# Patient Record
Sex: FEMALE | Race: WHITE | NOT HISPANIC OR LATINO | Employment: FULL TIME | URBAN - METROPOLITAN AREA
[De-identification: names, ages, dates, MRNs, and addresses within clinical notes are randomized per-mention and may not be internally consistent; named-entity substitution may affect disease eponyms.]

---

## 2021-04-28 ENCOUNTER — PATIENT MESSAGE (OUTPATIENT)
Dept: RESEARCH | Facility: HOSPITAL | Age: 26
End: 2021-04-28

## 2023-10-17 PROBLEM — R59.0 LAD (LYMPHADENOPATHY), POSTERIOR CERVICAL: Status: ACTIVE | Noted: 2023-10-17

## 2023-10-17 NOTE — PROGRESS NOTES
[facility] Hematolgy/Oncology  History & Physical    Subjective:      Patient ID:   NAME: Avis Gutierrez : 1995     27 y.o. female    Referring Doc: Laurence Kahn MD  Other Physicians:        Chief Complaint: posterior cervical LAD      HPI:  27 y.o. female with diagnosis of posterior cervical LAD who has been referred by Laurence Kahn MD for evaluation by medical hematology/oncology. She is here by herself. She noticed an LN on her left posterior neck about a month ago. [facility] and was prescribed some antibiotics but the LN never improved. She subsequently developed rash on the left neck sometime thereafter which was suspected of being shingles and was started on antivirals and it since resolved by she still has the LN.     She is  at [employer] . No tobacco or alcohol. Her  of breast cancer at 37yo. Her aunt  in her 60's of bladder or ureteral cancer.    No fevers or night sweats, no unexplained weight loss    She started ozempic about 4-5 months ago    Prior hx/of gallbladder issues resolved with change in diet    She has not been vaccinated for covid. She had covid back in               ROS:   GEN: normal without any fever, night sweats or weight loss; LN on left posterior neck for about a month  HEENT: normal with no HA's, sore throat, stiff neck, changes in vision  CV: normal with no CP, SOB, PND, MAXWELL or orthopnea  PULM: normal with no SOB, cough, hemoptysis, sputum or pleuritic pain  GI: normal with no abdominal pain, nausea, vomiting, constipation, diarrhea, melanotic stools, BRBPR, or hematemesis  : normal with no hematuria, dysuria  BREAST: normal with no mass, discharge, pain  SKIN: normal with no rash, erythema, bruising, or swelling       Past Medical/Surgical History:  Past Medical History:   Diagnosis Date    Cholelithiasis 2016    LAD (lymphadenopathy), posterior cervical 10/17/2023     History reviewed. No pertinent  "surgical history.      Allergies:  Review of patient's allergies indicates:  No Known Allergies      Social/Family History:  Social History     Socioeconomic History    Marital status: Single   Occupational History    Occupation: student & work at [employer]    Tobacco Use    Smoking status: Never    Smokeless tobacco: Never   Substance and Sexual Activity    Alcohol use: Yes     Alcohol/week: 1.0 standard drink of alcohol     Types: 1 Cans of beer per week     Comment: 2 beer per month    Drug use: No    Sexual activity: Never     Birth control/protection: None     Family History   Problem Relation Age of Onset    Cancer Mother         breast    Thyroid disease Sister          Medications:    Current Outpatient Medications:     acyclovir (ZOVIRAX) 400 MG tablet, TAKE 1 TABLET BY MOUTH 3 TIMES DAILY. (Patient not taking: Reported on 10/18/2023), Disp: 30 tablet, Rfl: 0    amoxicillin-clavulanate 500-125mg (AUGMENTIN) 500-125 mg Tab, Take 1 tablet (500 mg total) by mouth 2 (two) times daily. (Patient not taking: Reported on 9/6/2023), Disp: 10 tablet, Rfl: 0    clarithromycin (BIAXIN) 250 MG tablet, Take 2 tablets (500 mg total) by mouth every 12 (twelve) hours. (Patient not taking: Reported on 8/29/2023), Disp: 10 tablet, Rfl: 0    neomycin-polymyxin-gramicidin (NEOSPORIN, [id number],) ophthalmic solution, Place 1 drop into the right eye 4 (four) times daily. (Patient not taking: Reported on 1/11/2022), Disp: 10 mL, Rfl: 0      Pathology:   Cancer Staging   No matching staging information was found for the patient.      Objective:   Vitals:  Blood pressure 113/69, pulse 75, temperature 98 °F (36.7 °C), resp. rate 16, height 5' 2" (1.575 m), weight 81.5 kg (179 lb 11.2 oz).    Physical Examination:   GEN: no apparent distress, comfortable; AAOx3; mildly overweight  HEAD: atraumatic and normocephalic  EYES: no pallor, no icterus, PERRLA  ENT: OMM, no pharyngeal erythema, external ears WNL; no nasal discharge; no " thrush  NECK: no masses, thyroid normal, trachea midline, mildly enlarged posterior cervical left neck LN, supple  CV: RRR with no murmur; normal pulse; normal S1 and S2; no pedal edema  CHEST: Normal respiratory effort; CTAB; normal breath sounds; no wheeze or crackles  ABDOM: nontender and nondistended; soft; normal bowel sounds; no rebound/guarding  MUSC/Skeletal: ROM normal; no crepitus; joints normal; no deformities or arthropathy  EXTREM: no clubbing, cyanosis, inflammation or swelling  SKIN: no rashes, lesions, ulcers, petechiae or subcutaneous nodules  : no dickson  NEURO: grossly intact; motor/sensory WNL; AAOx3; no tremors  PSYCH: normal mood, affect and behavior  LYMPH: normal cervical, supraclavicular, axillary and groin LN's      Labs:   Lab Results   Component Value Date    WBC 4.97 08/29/2023    HGB 14.0 08/29/2023    HCT 41.2 08/29/2023    MCV 87 08/29/2023     08/29/2023    CMP  Sodium   Date Value Ref Range Status   08/29/2023 140 136 - 145 mmol/L Final     Potassium   Date Value Ref Range Status   08/29/2023 4.5 3.5 - 5.1 mmol/L Final     Chloride   Date Value Ref Range Status   08/29/2023 106 95 - 110 mmol/L Final     CO2   Date Value Ref Range Status   08/29/2023 25 23 - 29 mmol/L Final     Glucose   Date Value Ref Range Status   08/29/2023 88 70 - 110 mg/dL Final     BUN   Date Value Ref Range Status   08/29/2023 13 6 - 20 mg/dL Final     Creatinine   Date Value Ref Range Status   08/29/2023 0.8 0.5 - 1.4 mg/dL Final     Calcium   Date Value Ref Range Status   08/29/2023 9.6 8.7 - 10.5 mg/dL Final     Total Protein   Date Value Ref Range Status   08/29/2023 7.6 6.0 - 8.4 g/dL Final     Albumin   Date Value Ref Range Status   08/29/2023 4.4 3.5 - 5.2 g/dL Final     Total Bilirubin   Date Value Ref Range Status   08/29/2023 0.8 0.1 - 1.0 mg/dL Final     Comment:     For infants and newborns, interpretation of results should be based  on gestational age, weight and in agreement with  clinical  observations.    Premature Infant recommended reference ranges:  Up to 24 hours.............<8.0 mg/dL  Up to 48 hours............<12.0 mg/dL  3-5 days..................<15.0 mg/dL  6-29 days.................<15.0 mg/dL       Alkaline Phosphatase   Date Value Ref Range Status   2023 27 (L) 55 - 135 U/L Final     AST   Date Value Ref Range Status   2023 33 10 - 40 U/L Final     ALT   Date Value Ref Range Status   2023 26 10 - 44 U/L Final     Anion Gap   Date Value Ref Range Status   2023 9 8 - 16 mmol/L Final     eGFR if    Date Value Ref Range Status   10/22/2018 >60.0 >60 mL/min/1.73 m^2 Final     eGFR if non    Date Value Ref Range Status   10/22/2018 >60.0 >60 mL/min/1.73 m^2 Final     Comment:     Calculation used to obtain the estimated glomerular filtration  rate (eGFR) is the CKD-EPI equation.            Radiology/Diagnostic Studies:          All lab results and imaging results have been reviewed and discussed with the patient    Assessment:   (1) 27 y.o. female with diagnosis of posterior cervical LAD who has been referred by Laurence Kahn MD for evaluation by medical hematology/oncology. She noticed an LN on her left posterior neck about a month ago. She saw an ENT at [facility] and was prescribed some antibiotics but the LN never improved. She subsequently developed rash on the left neck sometime thereafter which was suspected of being shingles and was started on antivirals and it since resolved by she still has the LN. CBC is relatively WNL    - order CT chest/neck  - order mammogram    (2) Cholelithiasis    (3) Amenorrhea    (4) Obesity    (5) Fam hx/of breast cancer - mom  at 37yo    VISIT DIAGNOSES:     LAD (lymphadenopathy), posterior cervical    Posterior cervical adenopathy  -     Ambulatory referral/consult to Hematology / Oncology          Plan:     PLAN:  Order CT Chest and neck to further evaluate  Order  mammogram  Eventually needs to be in high breast clinic  F/u with PCP     RTC in  4 weeks   Fax note to Laurence Kahn MD        I have explained and the patient understands all of  the current recommendation(s). I have answered all of their questions to the best of my ability and to their complete satisfaction.           Thank you for allowing me to participate in this patient's care. Please call with any questions or concerns.    Electronically signed Connor Kwok MD

## 2023-10-18 ENCOUNTER — OFFICE VISIT (OUTPATIENT)
Dept: HEMATOLOGY/ONCOLOGY | Facility: CLINIC | Age: 28
End: 2023-10-18
Payer: COMMERCIAL

## 2023-10-18 ENCOUNTER — TELEPHONE (OUTPATIENT)
Dept: HEMATOLOGY/ONCOLOGY | Facility: CLINIC | Age: 28
End: 2023-10-18

## 2023-10-18 VITALS
DIASTOLIC BLOOD PRESSURE: 69 MMHG | HEIGHT: 62 IN | HEART RATE: 75 BPM | RESPIRATION RATE: 16 BRPM | BODY MASS INDEX: 33.07 KG/M2 | TEMPERATURE: 98 F | WEIGHT: 179.69 LBS | SYSTOLIC BLOOD PRESSURE: 113 MMHG

## 2023-10-18 DIAGNOSIS — R59.0 POSTERIOR CERVICAL ADENOPATHY: ICD-10-CM

## 2023-10-18 DIAGNOSIS — Z80.3 FAMILY HISTORY OF BREAST CANCER: Primary | ICD-10-CM

## 2023-10-18 DIAGNOSIS — R59.0 LAD (LYMPHADENOPATHY), POSTERIOR CERVICAL: ICD-10-CM

## 2023-10-18 PROCEDURE — 99203 OFFICE O/P NEW LOW 30 MIN: CPT | Mod: S$GLB,,, | Performed by: INTERNAL MEDICINE

## 2023-10-18 PROCEDURE — 3008F BODY MASS INDEX DOCD: CPT | Mod: CPTII,S$GLB,, | Performed by: INTERNAL MEDICINE

## 2023-10-18 PROCEDURE — 3074F PR MOST RECENT SYSTOLIC BLOOD PRESSURE < 130 MM HG: ICD-10-PCS | Mod: CPTII,S$GLB,, | Performed by: INTERNAL MEDICINE

## 2023-10-18 PROCEDURE — 3008F PR BODY MASS INDEX (BMI) DOCUMENTED: ICD-10-PCS | Mod: CPTII,S$GLB,, | Performed by: INTERNAL MEDICINE

## 2023-10-18 PROCEDURE — 3078F PR MOST RECENT DIASTOLIC BLOOD PRESSURE < 80 MM HG: ICD-10-PCS | Mod: CPTII,S$GLB,, | Performed by: INTERNAL MEDICINE

## 2023-10-18 PROCEDURE — 3078F DIAST BP <80 MM HG: CPT | Mod: CPTII,S$GLB,, | Performed by: INTERNAL MEDICINE

## 2023-10-18 PROCEDURE — 1160F PR REVIEW ALL MEDS BY PRESCRIBER/CLIN PHARMACIST DOCUMENTED: ICD-10-PCS | Mod: CPTII,S$GLB,, | Performed by: INTERNAL MEDICINE

## 2023-10-18 PROCEDURE — 3044F PR MOST RECENT HEMOGLOBIN A1C LEVEL <7.0%: ICD-10-PCS | Mod: CPTII,S$GLB,, | Performed by: INTERNAL MEDICINE

## 2023-10-18 PROCEDURE — 1159F MED LIST DOCD IN RCRD: CPT | Mod: CPTII,S$GLB,, | Performed by: INTERNAL MEDICINE

## 2023-10-18 PROCEDURE — 1160F RVW MEDS BY RX/DR IN RCRD: CPT | Mod: CPTII,S$GLB,, | Performed by: INTERNAL MEDICINE

## 2023-10-18 PROCEDURE — 3044F HG A1C LEVEL LT 7.0%: CPT | Mod: CPTII,S$GLB,, | Performed by: INTERNAL MEDICINE

## 2023-10-18 PROCEDURE — 1159F PR MEDICATION LIST DOCUMENTED IN MEDICAL RECORD: ICD-10-PCS | Mod: CPTII,S$GLB,, | Performed by: INTERNAL MEDICINE

## 2023-10-18 PROCEDURE — 3074F SYST BP LT 130 MM HG: CPT | Mod: CPTII,S$GLB,, | Performed by: INTERNAL MEDICINE

## 2023-10-18 PROCEDURE — 99203 PR OFFICE/OUTPT VISIT, NEW, LEVL III, 30-44 MIN: ICD-10-PCS | Mod: S$GLB,,, | Performed by: INTERNAL MEDICINE

## 2023-10-18 NOTE — TELEPHONE ENCOUNTER
Referral placed to high risk breast clinic per Dr Kwok's orders to do so. Cary aware to sent with all needed documents.

## 2023-10-27 ENCOUNTER — TELEPHONE (OUTPATIENT)
Dept: HEMATOLOGY/ONCOLOGY | Facility: CLINIC | Age: 28
End: 2023-10-27

## 2023-10-27 DIAGNOSIS — Z80.3 FAMILY HISTORY OF BREAST CANCER: Primary | ICD-10-CM

## 2023-10-27 DIAGNOSIS — R59.0 LAD (LYMPHADENOPATHY), POSTERIOR CERVICAL: ICD-10-CM

## 2023-10-27 NOTE — TELEPHONE ENCOUNTER
----- Message from JESSICA Leon sent at 10/27/2023 11:58 AM CDT -----  Regarding: RE: payable code needed  Yes switch to diagnostic    Libby  ----- Message -----  From: Cristal Tello, RN  Sent: 10/27/2023  11:07 AM CDT  To: JESSICA Leon  Subject: FW: payable code needed                          Can you please review and let me know if ok to keep as screening and just update code to screening for high risk or if I need to change to diagnostic mammo? Looks like Dr. ROBERTS saw her and placed referral to high risk breast clinic.   ----- Message -----  From: Huey Strong  Sent: 10/27/2023  10:21 AM CDT  To: Sundar Ryan Staff  Subject: payable code needed                              Good Morning,   The above mentioned PT has a Screening Mammo scheduled on Monday. Is this supposed to be a Diag Mammo?The pt is under 40 and the codes provided will work for a Diagnostic but will not be payable for a screening.   Can we get this updated please?    Thanks for your help,    Huey  [facility]-Service  [phone]

## 2023-10-27 NOTE — TELEPHONE ENCOUNTER
----- Message from JESSICA Leon sent at 10/27/2023 11:58 AM CDT -----  Regarding: RE: payable code needed  Yes switch to diagnostic    Libby  ----- Message -----  From: Cristal Tello, RN  Sent: 10/27/2023  11:07 AM CDT  To: JESSICA Leon  Subject: FW: payable code needed                          Can you please review and let me know if ok to keep as screening and just update code to screening for high risk or if I need to change to diagnostic mammo? Looks like Dr. ROBERTS saw her and placed referral to high risk breast clinic.   ----- Message -----  From: Huey Strong  Sent: 10/27/2023  10:21 AM CDT  To: Sundar Ryan Staff  Subject: payable code needed                              Good Morning,   The above mentioned PT has a Screening Mammo scheduled on Monday. Is this supposed to be a Diag Mammo?The pt is under 40 and the codes provided will work for a Diagnostic but will not be payable for a screening.   Can we get this updated please?    Thanks for your help,    Huey  [facility] Pre-Service  [phone]

## 2023-10-30 ENCOUNTER — HOSPITAL ENCOUNTER (OUTPATIENT)
Dept: RADIOLOGY | Facility: HOSPITAL | Age: 28
Discharge: HOME | End: 2023-10-30
Attending: INTERNAL MEDICINE
Payer: COMMERCIAL

## 2023-10-30 DIAGNOSIS — R59.0 POSTERIOR CERVICAL ADENOPATHY: ICD-10-CM

## 2023-10-30 DIAGNOSIS — R59.0 LAD (LYMPHADENOPATHY), POSTERIOR CERVICAL: ICD-10-CM

## 2023-10-30 PROCEDURE — 71260 CT THORAX DX C+: CPT | Mod: TC

## 2023-10-30 PROCEDURE — 25500020 PHARM REV CODE 255: Performed by: INTERNAL MEDICINE

## 2023-10-30 RX ADMIN — IOHEXOL 100 ML: 350 INJECTION, SOLUTION INTRAVENOUS at 08:10

## 2023-11-13 NOTE — PROGRESS NOTES
[facility] Hematology/Oncology  PROGRESS NOTE - 2nd Follow-up Visit      Subjective:       Patient ID:   NAME: Avis Gutierrez : 1995     27 y.o. female    Referring Doc: Laurence Kahn MD  Other Physicians:        Chief Complaint: posterior cervical LAD f/u      History of Present Illness:     Patient returns today for a 2nd regularly scheduled follow-up visit.  The patient is here today to go over the results of the recently ordered labs, tests and studies. She is here by herself.    She is doing ok; some more frequent HA's than usual. No changes in the LN's. No fevers night sweats or weight loss    Breathing ok , NO CP, N/V    She had CT Neck and chest on 10/30            ROS:   GENNo fevers or night sweats, no unexplained weight loss : normal without any fever, night sweats or weight loss  HEENT: normal with no HA's, sore throat, stiff neck, changes in vision  CV: normal with no CP, SOB, PND, MAXWELL or orthopnea  PULM: normal with no SOB, cough, hemoptysis, sputum or pleuritic pain  GI: normal with no abdominal pain, nausea, vomiting, constipation, diarrhea, melanotic stools, BRBPR, or hematemesis  : normal with no hematuria, dysuria  BREAST: normal with no mass, discharge, pain  SKIN: normal with no rash, erythema, bruising, or swelling    Pain Scale:    Allergies:  Review of patient's allergies indicates:  No Known Allergies    Medications:    Current Outpatient Medications:     acyclovir (ZOVIRAX) 400 MG tablet, TAKE 1 TABLET BY MOUTH 3 TIMES DAILY. (Patient not taking: Reported on 10/18/2023), Disp: 30 tablet, Rfl: 0    amoxicillin-clavulanate 500-125mg (AUGMENTIN) 500-125 mg Tab, Take 1 tablet (500 mg total) by mouth 2 (two) times daily. (Patient not taking: Reported on 2023), Disp: 10 tablet, Rfl: 0    clarithromycin (BIAXIN) 250 MG tablet, Take 2 tablets (500 mg total) by mouth every 12 (twelve) hours. (Patient not taking: Reported on 2023), Disp: 10 tablet, Rfl: 0     neomycin-polymyxin-gramicidin (NEOSPORIN, [id number],) ophthalmic solution, Place 1 drop into the right eye 4 (four) times daily. (Patient not taking: Reported on 1/11/2022), Disp: 10 mL, Rfl: 0    PMHx/PSHx Updates:  See patient's last visit with me on 10/18/2023.  See H&P on         Pathology:  Cancer Staging   No matching staging information was found for the patient.          Objective:     Vitals:  There were no vitals taken for this visit.    Physical Examination:   GEN: no apparent distress, comfortable; AAOx3; mildly overweight  HEAD: atraumatic and normocephalic  EYES: no pallor, no icterus, PERRLA  ENT: OMM, no pharyngeal erythema, external ears WNL; no nasal discharge; no thrush  NECK: no masses, thyroid normal, trachea midline, mildly enlarged posterior cervical left neck LN, supple  CV: RRR with no murmur; normal pulse; normal S1 and S2; no pedal edema  CHEST: Normal respiratory effort; CTAB; normal breath sounds; no wheeze or crackles  ABDOM: nontender and nondistended; soft; normal bowel sounds; no rebound/guarding  MUSC/Skeletal: ROM normal; no crepitus; joints normal; no deformities or arthropathy  EXTREM: no clubbing, cyanosis, inflammation or swelling  SKIN: no rashes, lesions, ulcers, petechiae or subcutaneous nodules  : no dickson  NEURO: grossly intact; motor/sensory WNL; AAOx3; no tremors  PSYCH: normal mood, affect and behavior  LYMPH: normal cervical, supraclavicular, axillary and groin LN's            Labs:     Lab Results   Component Value Date    WBC 4.97 08/29/2023    HGB 14.0 08/29/2023    HCT 41.2 08/29/2023    MCV 87 08/29/2023     08/29/2023       CMP  Sodium   Date Value Ref Range Status   08/29/2023 140 136 - 145 mmol/L Final     Potassium   Date Value Ref Range Status   08/29/2023 4.5 3.5 - 5.1 mmol/L Final     Chloride   Date Value Ref Range Status   08/29/2023 106 95 - 110 mmol/L Final     CO2   Date Value Ref Range Status   08/29/2023 25 23 - 29 mmol/L Final      Glucose   Date Value Ref Range Status   08/29/2023 88 70 - 110 mg/dL Final     BUN   Date Value Ref Range Status   08/29/2023 13 6 - 20 mg/dL Final     Creatinine   Date Value Ref Range Status   08/29/2023 0.8 0.5 - 1.4 mg/dL Final     Calcium   Date Value Ref Range Status   08/29/2023 9.6 8.7 - 10.5 mg/dL Final     Total Protein   Date Value Ref Range Status   08/29/2023 7.6 6.0 - 8.4 g/dL Final     Albumin   Date Value Ref Range Status   08/29/2023 4.4 3.5 - 5.2 g/dL Final     Total Bilirubin   Date Value Ref Range Status   08/29/2023 0.8 0.1 - 1.0 mg/dL Final     Comment:     For infants and newborns, interpretation of results should be based  on gestational age, weight and in agreement with clinical  observations.    Premature Infant recommended reference ranges:  Up to 24 hours.............<8.0 mg/dL  Up to 48 hours............<12.0 mg/dL  3-5 days..................<15.0 mg/dL  6-29 days.................<15.0 mg/dL       Alkaline Phosphatase   Date Value Ref Range Status   08/29/2023 27 (L) 55 - 135 U/L Final     AST   Date Value Ref Range Status   08/29/2023 33 10 - 40 U/L Final     ALT   Date Value Ref Range Status   08/29/2023 26 10 - 44 U/L Final     Anion Gap   Date Value Ref Range Status   08/29/2023 9 8 - 16 mmol/L Final     eGFR   Date Value Ref Range Status   08/29/2023 >60.0 >60 mL/min/1.73 m^2 Final           Radiology/Diagnostic Studies:    CT Neck Chest With Contrast (XPD)    Result Date: 10/30/2023  CMS MANDATED QUALITY DATA - CT RADIATION 436 All CT scans at this facility utilize dose modulation, iterative reconstruction, and/or weight based dosing when appropriate to reduce radiation dose to as low as reasonably achievable. CT of the neck and chest with contrast HISTORY: Cervical lymphadenopathy. Examination utilizes 100 mL Omnipaque 350. There are no prior studies available for comparison purposes. The visualized intracranial structures and skull base appear unremarkable. The visualized  paranasal sinuses, mastoid air cells and middle ear cavities appear appropriately aerated. The major salivary glands and pharyngeal and laryngeal soft tissues and visualized upper airway appear unremarkable. There are numerous scattered normal-sized cervical lymph nodes. Borderline prominent lymph nodes in the jugulodigastric region measure up to 10 mm on the left. Left posterior cervical chain lymph node measures 5 mm in short axis dimension. The infrahyoid portion of the neck including the thyroid gland appears unremarkable. There is no pathologically enlarged hilar, mediastinal or axillary adenopathy. The heart is normal in size. There is no significant pericardial fluid. The heart and great vessels enhance appropriately. The lungs are appropriately expanded. There are no confluent areas of airspace disease or significant volume loss. No suspicious parenchymal nodules are identified. There is no effusion. The central airways are patent. Within the visualized portion of the upper abdomen, numerous gallstones are seen within the gallbladder. No acute osseous abnormality identified. IMPRESSION: Borderline prominent jugulodigastric lymph nodes with otherwise scattered normal-sized cervical lymph nodes demonstrated. No pathologic adenopathy observed within the chest. Cholelithiasis. Electronically signed by:  Rachel Park MD  10/30/2023 09:42 AM CDT Workstation: [id number]      I have reviewed all available lab results and radiology reports.    Assessment/Plan:   (1) 27 y.o. female with diagnosis of posterior cervical LAD who has been referred by Laurence Kahn MD for evaluation by medical hematology/oncology. She noticed an LN on her left posterior neck about a month ago. She saw an ENT at [facility] and was prescribed some antibiotics but the LN never improved. She subsequently developed rash on the left neck sometime thereafter which was suspected of being shingles and was started on antivirals and it  since resolved by she still has the LN. CBC is relatively WNL     - order CT chest/neck  - order mammogram    11/15/2023:  - CT Chest and neck done on 10/30  - only borderline prominent jugulodigastric lymph nodes with otherwise scattered normal-sized cervical lymph nodes demonstrated; and no pathologic adenopathy observed within the chest.     (2) Cholelithiasis     (3) Amenorrhea     (4) Obesity     (5) Fam hx/of breast cancer - mom  at 39yo         VISIT DIAGNOSES:      LAD (lymphadenopathy), posterior cervical          PLAN:  Refer to ENT for opinion but expect she will just need to have continued observation  Ordered mammogram scheduled for this coming Monday  Eventually needs to be in high breast clinic  F/u with PCP     RTC in  4-6 weeks   Fax note to Laurence Kahn MD Vadiee, Abdolreza, MD,      Discussion:     COVID-19 Discussion:    I had long discussion with patient and any applicable family about the COVID-19 coronavirus epidemic and the recommended precautions with regard to cancer and/or hematology patients. I have re-iterated the CDC recommendations for adequate hand washing, use of hand -like products, and coughing into elbow, etc. In addition, especially for our patients who are on chemotherapy and/or our otherwise immunocompromised patients, I have recommended avoidance of crowds, including movie theaters, restaurants, churches, etc. I have recommended avoidance of any sick or symptomatic family members and/or friends. I have also recommended avoidance of any raw and unwashed food products, and general avoidance of food items that have not been prepared by themselves. The patient has been asked to call us immediately with any symptom developments, issues, questions or other general concerns.     I spent over 25 mins of time with the patient. Reviewed results of the recently ordered labs, tests and studies; made directives with regards to the results. Over half of this time was  spent couseling and coordinating care.    I have explained all of the above in detail and the patient understands all of the current recommendation(s). I have answered all of their questions to the best of my ability and to their complete satisfaction.   The patient is to continue with the current management plan.            Electronically signed by Connor Kwok MD

## 2023-11-15 ENCOUNTER — TELEPHONE (OUTPATIENT)
Dept: OTOLARYNGOLOGY | Facility: CLINIC | Age: 28
End: 2023-11-15
Payer: COMMERCIAL

## 2023-11-15 ENCOUNTER — OFFICE VISIT (OUTPATIENT)
Dept: HEMATOLOGY/ONCOLOGY | Facility: CLINIC | Age: 28
End: 2023-11-15
Payer: COMMERCIAL

## 2023-11-15 VITALS
WEIGHT: 176.13 LBS | DIASTOLIC BLOOD PRESSURE: 86 MMHG | SYSTOLIC BLOOD PRESSURE: 121 MMHG | BODY MASS INDEX: 32.41 KG/M2 | RESPIRATION RATE: 16 BRPM | HEART RATE: 73 BPM | TEMPERATURE: 97 F | HEIGHT: 62 IN

## 2023-11-15 DIAGNOSIS — R59.0 LAD (LYMPHADENOPATHY), POSTERIOR CERVICAL: Primary | ICD-10-CM

## 2023-11-15 PROCEDURE — 3079F DIAST BP 80-89 MM HG: CPT | Mod: CPTII,S$GLB,, | Performed by: INTERNAL MEDICINE

## 2023-11-15 PROCEDURE — 3074F PR MOST RECENT SYSTOLIC BLOOD PRESSURE < 130 MM HG: ICD-10-PCS | Mod: CPTII,S$GLB,, | Performed by: INTERNAL MEDICINE

## 2023-11-15 PROCEDURE — 1159F PR MEDICATION LIST DOCUMENTED IN MEDICAL RECORD: ICD-10-PCS | Mod: CPTII,S$GLB,, | Performed by: INTERNAL MEDICINE

## 2023-11-15 PROCEDURE — 1160F PR REVIEW ALL MEDS BY PRESCRIBER/CLIN PHARMACIST DOCUMENTED: ICD-10-PCS | Mod: CPTII,S$GLB,, | Performed by: INTERNAL MEDICINE

## 2023-11-15 PROCEDURE — 99214 OFFICE O/P EST MOD 30 MIN: CPT | Mod: S$GLB,,, | Performed by: INTERNAL MEDICINE

## 2023-11-15 PROCEDURE — 1159F MED LIST DOCD IN RCRD: CPT | Mod: CPTII,S$GLB,, | Performed by: INTERNAL MEDICINE

## 2023-11-15 PROCEDURE — 1160F RVW MEDS BY RX/DR IN RCRD: CPT | Mod: CPTII,S$GLB,, | Performed by: INTERNAL MEDICINE

## 2023-11-15 PROCEDURE — 3079F PR MOST RECENT DIASTOLIC BLOOD PRESSURE 80-89 MM HG: ICD-10-PCS | Mod: CPTII,S$GLB,, | Performed by: INTERNAL MEDICINE

## 2023-11-15 PROCEDURE — 3074F SYST BP LT 130 MM HG: CPT | Mod: CPTII,S$GLB,, | Performed by: INTERNAL MEDICINE

## 2023-11-15 PROCEDURE — 3008F PR BODY MASS INDEX (BMI) DOCUMENTED: ICD-10-PCS | Mod: CPTII,S$GLB,, | Performed by: INTERNAL MEDICINE

## 2023-11-15 PROCEDURE — 3044F PR MOST RECENT HEMOGLOBIN A1C LEVEL <7.0%: ICD-10-PCS | Mod: CPTII,S$GLB,, | Performed by: INTERNAL MEDICINE

## 2023-11-15 PROCEDURE — 99214 PR OFFICE/OUTPT VISIT, EST, LEVL IV, 30-39 MIN: ICD-10-PCS | Mod: S$GLB,,, | Performed by: INTERNAL MEDICINE

## 2023-11-15 PROCEDURE — 3044F HG A1C LEVEL LT 7.0%: CPT | Mod: CPTII,S$GLB,, | Performed by: INTERNAL MEDICINE

## 2023-11-15 PROCEDURE — 3008F BODY MASS INDEX DOCD: CPT | Mod: CPTII,S$GLB,, | Performed by: INTERNAL MEDICINE

## 2023-11-15 NOTE — TELEPHONE ENCOUNTER
Left message on voicemail for pt to call back to schedule ENT referral. Dr. Kwok's office faxed us the referral asking us to call pt to schedule. Thanks, Karen

## 2023-11-20 ENCOUNTER — TELEPHONE (OUTPATIENT)
Dept: HEMATOLOGY/ONCOLOGY | Facility: CLINIC | Age: 28
End: 2023-11-20

## 2023-11-20 ENCOUNTER — HOSPITAL ENCOUNTER (OUTPATIENT)
Dept: RADIOLOGY | Facility: HOSPITAL | Age: 28
Discharge: HOME | End: 2023-11-20
Attending: NURSE PRACTITIONER
Payer: COMMERCIAL

## 2023-11-20 DIAGNOSIS — Z91.89 AT HIGH RISK FOR BREAST CANCER: Primary | ICD-10-CM

## 2023-11-20 DIAGNOSIS — R59.0 LAD (LYMPHADENOPATHY), POSTERIOR CERVICAL: ICD-10-CM

## 2023-11-20 DIAGNOSIS — Z80.3 FAMILY HISTORY OF BREAST CANCER: ICD-10-CM

## 2023-11-20 NOTE — TELEPHONE ENCOUNTER
Spoke with patient and reviewed that mammogram was normal, however Tyrer-Cuzick score was elevated and recommendation for High Risk Breast Clinic was made. Patient verbalized understanding, is agreeable to referral to the High Risk Breast Clinic.

## 2023-11-20 NOTE — TELEPHONE ENCOUNTER
----- Message from Zoe Rosen RN sent at 2023  1:22 PM CST -----    ----- Message -----  From: Libby Jin NP-C  Sent: 2023  11:02 AM CST  To: Connor Kwok MD; Davin Souza Staff    Please call and notify patient her mammogram results was normal, but her TC is 21% Can get her in to the high risk clinic. Mom  of breast cancer at 38. Can discuss genetic testing as well.

## 2023-11-21 ENCOUNTER — TELEPHONE (OUTPATIENT)
Dept: ONCOLOGY | Facility: CLINIC | Age: 28
End: 2023-11-21

## 2023-11-22 ENCOUNTER — TELEPHONE (OUTPATIENT)
Dept: ONCOLOGY | Facility: CLINIC | Age: 28
End: 2023-11-22

## 2023-12-06 ENCOUNTER — TELEPHONE (OUTPATIENT)
Dept: HEMATOLOGY/ONCOLOGY | Facility: CLINIC | Age: 28
End: 2023-12-06
Payer: COMMERCIAL

## 2023-12-06 NOTE — NURSING
Returned pt's call.  Upon reviewing chart, I noticed the high risk breast clinic in [place] had been trying to reach her.  Confirmed with pt, she would prefer to go to [place].  Sent message to Charisma Chan.

## 2023-12-06 NOTE — TELEPHONE ENCOUNTER
----- Message from Ashley Bello, Patient Care Assistant sent at 12/6/2023  9:49 AM CST -----  Type: Needs Medical Advice  Who Called:  Avis  Best Call Back Number: [phone]    Additional Information: [facility] has referrals for ENT as well as a breast specialty  , please call to further  discuss,. Thank you .

## 2023-12-12 NOTE — PROGRESS NOTES
[facility]  PROGRESS NOTE -   Follow-up Visit      Subjective:       Patient ID:   NAME: Avis Gutierrez : 1995     28 y.o. female    Referring Doc: Laurence Kahn MD  Other Physicians:        Chief Complaint: posterior cervical LAD f/u      History of Present Illness:     Patient returns today for a regularly scheduled follow-up visit.  The patient is here today to go over the results of the recently ordered labs, tests and studies. She is here by herself.    She has been having some more pain than usual on the left posterior neck for past couple of weeks. No changes in the LN's. No fevers night sweats or weight loss. Breathing ok , NO CP, N/V. Some more fatigue than usual.    She had CT Neck and chest on 10/30/2023 and she had mammogram on 2023. She sees Libby Jin NP with High Risk Breast in 2024    She has not followed up with ENT            ROS:   GENNo fevers or night sweats, no unexplained weight loss : normal without any fever, night sweats or weight loss; some intermittent neck pain (high of 6 or 7)  HEENT: normal with no HA's, sore throat, stiff neck, changes in vision  CV: normal with no CP, SOB, PND, MAXWELL or orthopnea  PULM: normal with no SOB, cough, hemoptysis, sputum or pleuritic pain  GI: normal with no abdominal pain, nausea, vomiting, constipation, diarrhea, melanotic stools, BRBPR, or hematemesis  : normal with no hematuria, dysuria  BREAST: normal with no mass, discharge, pain  SKIN: normal with no rash, erythema, bruising, or swelling    Pain Scale: see above    Allergies:  Review of patient's allergies indicates:  No Known Allergies    Medications:    Current Outpatient Medications:     acyclovir (ZOVIRAX) 400 MG tablet, TAKE 1 TABLET BY MOUTH 3 TIMES DAILY. (Patient not taking: Reported on 10/18/2023), Disp: 30 tablet, Rfl: 0    amoxicillin-clavulanate 500-125mg (AUGMENTIN) 500-125 mg Tab, Take 1 tablet (500 mg total) by mouth 2 (two) times daily.  (Patient not taking: Reported on 9/6/2023), Disp: 10 tablet, Rfl: 0    clarithromycin (BIAXIN) 250 MG tablet, Take 2 tablets (500 mg total) by mouth every 12 (twelve) hours. (Patient not taking: Reported on 8/29/2023), Disp: 10 tablet, Rfl: 0    neomycin-polymyxin-gramicidin (NEOSPORIN, XTB-BXHUQ-OBJMIICP,) ophthalmic solution, Place 1 drop into the right eye 4 (four) times daily. (Patient not taking: Reported on 1/11/2022), Disp: 10 mL, Rfl: 0    PMHx/PSHx Updates:  See patient's last visit with me on 11/15/2023.  See H&P on 10/18/2023        Pathology:   Cancer Staging   No matching staging information was found for the patient.          Objective:     Vitals:  Blood pressure 115/74, pulse 73, temperature 98.2 °F (36.8 °C), resp. rate 16, weight 77.7 kg (171 lb 3.2 oz).    Physical Examination:   GEN: no apparent distress, comfortable; AAOx3; mildly overweight  HEAD: atraumatic and normocephalic  EYES: no pallor, no icterus, PERRLA  ENT: OMM, no pharyngeal erythema, external ears WNL; no nasal discharge; no thrush  NECK: no masses, thyroid normal, trachea midline, mildly enlarged posterior cervical left neck LN, supple  CV: RRR with no murmur; normal pulse; normal S1 and S2; no pedal edema  CHEST: Normal respiratory effort; CTAB; normal breath sounds; no wheeze or crackles  ABDOM: nontender and nondistended; soft; normal bowel sounds; no rebound/guarding  MUSC/Skeletal: ROM normal; no crepitus; joints normal; no deformities or arthropathy  EXTREM: no clubbing, cyanosis, inflammation or swelling  SKIN: no rashes, lesions, ulcers, petechiae or subcutaneous nodules  : no dickson  NEURO: grossly intact; motor/sensory WNL; AAOx3; no tremors  PSYCH: normal mood, affect and behavior  LYMPH: normal cervical, supraclavicular, axillary and groin LN's            Labs:     Lab Results   Component Value Date    WBC 4.97 08/29/2023    HGB 14.0 08/29/2023    HCT 41.2 08/29/2023    MCV 87 08/29/2023     08/29/2023        CMP  Sodium   Date Value Ref Range Status   08/29/2023 140 136 - 145 mmol/L Final     Potassium   Date Value Ref Range Status   08/29/2023 4.5 3.5 - 5.1 mmol/L Final     Chloride   Date Value Ref Range Status   08/29/2023 106 95 - 110 mmol/L Final     CO2   Date Value Ref Range Status   08/29/2023 25 23 - 29 mmol/L Final     Glucose   Date Value Ref Range Status   08/29/2023 88 70 - 110 mg/dL Final     BUN   Date Value Ref Range Status   08/29/2023 13 6 - 20 mg/dL Final     Creatinine   Date Value Ref Range Status   08/29/2023 0.8 0.5 - 1.4 mg/dL Final     Calcium   Date Value Ref Range Status   08/29/2023 9.6 8.7 - 10.5 mg/dL Final     Total Protein   Date Value Ref Range Status   08/29/2023 7.6 6.0 - 8.4 g/dL Final     Albumin   Date Value Ref Range Status   08/29/2023 4.4 3.5 - 5.2 g/dL Final     Total Bilirubin   Date Value Ref Range Status   08/29/2023 0.8 0.1 - 1.0 mg/dL Final     Comment:     For infants and newborns, interpretation of results should be based  on gestational age, weight and in agreement with clinical  observations.    Premature Infant recommended reference ranges:  Up to 24 hours.............<8.0 mg/dL  Up to 48 hours............<12.0 mg/dL  3-5 days..................<15.0 mg/dL  6-29 days.................<15.0 mg/dL       Alkaline Phosphatase   Date Value Ref Range Status   08/29/2023 27 (L) 55 - 135 U/L Final     AST   Date Value Ref Range Status   08/29/2023 33 10 - 40 U/L Final     ALT   Date Value Ref Range Status   08/29/2023 26 10 - 44 U/L Final     Anion Gap   Date Value Ref Range Status   08/29/2023 9 8 - 16 mmol/L Final     eGFR   Date Value Ref Range Status   08/29/2023 >60.0 >60 mL/min/1.73 m^2 Final           Radiology/Diagnostic Studies:    CT Neck Chest With Contrast (XPD)    Result Date: 10/30/2023  CMS MANDATED QUALITY DATA - CT RADIATION 436 All CT scans at this facility utilize dose modulation, iterative reconstruction, and/or weight based dosing when appropriate to  reduce radiation dose to as low as reasonably achievable. CT of the neck and chest with contrast HISTORY: Cervical lymphadenopathy. Examination utilizes 100 mL Omnipaque 350. There are no prior studies available for comparison purposes. The visualized intracranial structures and skull base appear unremarkable. The visualized paranasal sinuses, mastoid air cells and middle ear cavities appear appropriately aerated. The major salivary glands and pharyngeal and laryngeal soft tissues and visualized upper airway appear unremarkable. There are numerous scattered normal-sized cervical lymph nodes. Borderline prominent lymph nodes in the jugulodigastric region measure up to 10 mm on the left. Left posterior cervical chain lymph node measures 5 mm in short axis dimension. The infrahyoid portion of the neck including the thyroid gland appears unremarkable. There is no pathologically enlarged hilar, mediastinal or axillary adenopathy. The heart is normal in size. There is no significant pericardial fluid. The heart and great vessels enhance appropriately. The lungs are appropriately expanded. There are no confluent areas of airspace disease or significant volume loss. No suspicious parenchymal nodules are identified. There is no effusion. The central airways are patent. Within the visualized portion of the upper abdomen, numerous gallstones are seen within the gallbladder. No acute osseous abnormality identified.     IMPRESSION: Borderline prominent jugulodigastric lymph nodes with otherwise scattered normal-sized cervical lymph nodes demonstrated. No pathologic adenopathy observed within the chest. Cholelithiasis. Electronically signed by:  Rachel Park MD  10/30/2023 09:42 AM CDT Workstation: [id number]      I have reviewed all available lab results and radiology reports.    Assessment/Plan:   (1) 28 y.o. female with diagnosis of posterior cervical LAD who has been referred by Laurence Kahn MD for evaluation by medical  hematology/oncology. She noticed an LN on her left posterior neck about a month ago. She saw an ENT at [facility] and was prescribed some antibiotics but the LN never improved. She subsequently developed rash on the left neck sometime thereafter which was suspected of being shingles and was started on antivirals and it since resolved by she still has the LN. CBC is relatively WNL     - order CT chest/neck  - order mammogram    11/15/2023:  - CT Chest and neck done on 10/30  - only borderline prominent jugulodigastric lymph nodes with otherwise scattered normal-sized cervical lymph nodes demonstrated; and no pathologic adenopathy observed within the chest.    2023:  - schedule MRI soft tissue and bone of neck  - encouraged her to proceed with ENT evaluation  - proceed with high risk breast clinic appointment in 2024  - consider PM&R pending MRI results     (2) Cholelithiasis     (3) Amenorrhea     (4) Obesity     (5) Fam hx/of breast cancer - mom  at 37yo         VISIT DIAGNOSES:      LAD (lymphadenopathy), posterior cervical          PLAN:  Set up MRI of soft tissue and bone of cervical neck  Encouraged her to proceed with ENT evaluation  Proceed with High Risk breast clinic appointment in 2024  Consider PM&R evaluation pending the MRI results  F/u with PCP     RTC in  6-12 weeks   Fax note to Laurence Kahn MD Vadiee, Abdolreza, MD,  Juancarlos    Discussion:     COVID-19 Discussion:    I had long discussion with patient and any applicable family about the COVID-19 coronavirus epidemic and the recommended precautions with regard to cancer and/or hematology patients. I have re-iterated the CDC recommendations for adequate hand washing, use of hand -like products, and coughing into elbow, etc. In addition, especially for our patients who are on chemotherapy and/or our otherwise immunocompromised patients, I have recommended avoidance of crowds, including movie theaters,  restaurants, churches, etc. I have recommended avoidance of any sick or symptomatic family members and/or friends. I have also recommended avoidance of any raw and unwashed food products, and general avoidance of food items that have not been prepared by themselves. The patient has been asked to call us immediately with any symptom developments, issues, questions or other general concerns.     I spent over 25 mins of time with the patient. Reviewed results of the recently ordered labs, tests and studies; made directives with regards to the results. Over half of this time was spent couseling and coordinating care.    I have explained all of the above in detail and the patient understands all of the current recommendation(s). I have answered all of their questions to the best of my ability and to their complete satisfaction.   The patient is to continue with the current management plan.            Electronically signed by Connor Kwok MD          Answers submitted by the patient for this visit:  Review of Systems Questionnaire (Submitted on 12/12/2023)  appetite change : No  unexpected weight change: No  mouth sores: No  visual disturbance: No  cough: No  shortness of breath: No  chest pain: No  abdominal pain: No  diarrhea: No  frequency: No  back pain: Yes  rash: No  headaches: No  adenopathy: Yes  nervous/ anxious: Yes

## 2023-12-13 ENCOUNTER — TELEPHONE (OUTPATIENT)
Dept: OTOLARYNGOLOGY | Facility: CLINIC | Age: 28
End: 2023-12-13
Payer: COMMERCIAL

## 2023-12-13 ENCOUNTER — OFFICE VISIT (OUTPATIENT)
Dept: HEMATOLOGY/ONCOLOGY | Facility: CLINIC | Age: 28
End: 2023-12-13
Payer: COMMERCIAL

## 2023-12-13 VITALS
DIASTOLIC BLOOD PRESSURE: 74 MMHG | TEMPERATURE: 98 F | WEIGHT: 171.19 LBS | BODY MASS INDEX: 31.31 KG/M2 | RESPIRATION RATE: 16 BRPM | HEART RATE: 73 BPM | SYSTOLIC BLOOD PRESSURE: 115 MMHG

## 2023-12-13 DIAGNOSIS — M54.2 NECK PAIN ON LEFT SIDE: ICD-10-CM

## 2023-12-13 DIAGNOSIS — R59.0 LAD (LYMPHADENOPATHY), POSTERIOR CERVICAL: Primary | ICD-10-CM

## 2023-12-13 PROCEDURE — 3008F BODY MASS INDEX DOCD: CPT | Mod: CPTII,S$GLB,, | Performed by: INTERNAL MEDICINE

## 2023-12-13 PROCEDURE — 1159F PR MEDICATION LIST DOCUMENTED IN MEDICAL RECORD: ICD-10-PCS | Mod: CPTII,S$GLB,, | Performed by: INTERNAL MEDICINE

## 2023-12-13 PROCEDURE — 99213 OFFICE O/P EST LOW 20 MIN: CPT | Mod: S$GLB,,, | Performed by: INTERNAL MEDICINE

## 2023-12-13 PROCEDURE — 1160F RVW MEDS BY RX/DR IN RCRD: CPT | Mod: CPTII,S$GLB,, | Performed by: INTERNAL MEDICINE

## 2023-12-13 PROCEDURE — 3078F DIAST BP <80 MM HG: CPT | Mod: CPTII,S$GLB,, | Performed by: INTERNAL MEDICINE

## 2023-12-13 PROCEDURE — 3074F SYST BP LT 130 MM HG: CPT | Mod: CPTII,S$GLB,, | Performed by: INTERNAL MEDICINE

## 2023-12-13 PROCEDURE — 3078F PR MOST RECENT DIASTOLIC BLOOD PRESSURE < 80 MM HG: ICD-10-PCS | Mod: CPTII,S$GLB,, | Performed by: INTERNAL MEDICINE

## 2023-12-13 PROCEDURE — 3044F PR MOST RECENT HEMOGLOBIN A1C LEVEL <7.0%: ICD-10-PCS | Mod: CPTII,S$GLB,, | Performed by: INTERNAL MEDICINE

## 2023-12-13 PROCEDURE — 1159F MED LIST DOCD IN RCRD: CPT | Mod: CPTII,S$GLB,, | Performed by: INTERNAL MEDICINE

## 2023-12-13 PROCEDURE — 99213 PR OFFICE/OUTPT VISIT, EST, LEVL III, 20-29 MIN: ICD-10-PCS | Mod: S$GLB,,, | Performed by: INTERNAL MEDICINE

## 2023-12-13 PROCEDURE — 3008F PR BODY MASS INDEX (BMI) DOCUMENTED: ICD-10-PCS | Mod: CPTII,S$GLB,, | Performed by: INTERNAL MEDICINE

## 2023-12-13 PROCEDURE — 1160F PR REVIEW ALL MEDS BY PRESCRIBER/CLIN PHARMACIST DOCUMENTED: ICD-10-PCS | Mod: CPTII,S$GLB,, | Performed by: INTERNAL MEDICINE

## 2023-12-13 PROCEDURE — 3044F HG A1C LEVEL LT 7.0%: CPT | Mod: CPTII,S$GLB,, | Performed by: INTERNAL MEDICINE

## 2023-12-13 PROCEDURE — 3074F PR MOST RECENT SYSTOLIC BLOOD PRESSURE < 130 MM HG: ICD-10-PCS | Mod: CPTII,S$GLB,, | Performed by: INTERNAL MEDICINE

## 2024-01-16 ENCOUNTER — TELEPHONE (OUTPATIENT)
Dept: HEMATOLOGY/ONCOLOGY | Facility: CLINIC | Age: 29
End: 2024-01-16

## 2024-01-23 ENCOUNTER — TELEPHONE (OUTPATIENT)
Dept: HEMATOLOGY/ONCOLOGY | Facility: CLINIC | Age: 29
End: 2024-01-23

## 2024-01-23 NOTE — TELEPHONE ENCOUNTER
I spoke with pt and reminded her to have mri and labs done for tomorrow appt pt states that she was not aware that mri was even approved and so she would like to r/s I sent Leigha TORRES a message to r/s.

## 2024-03-08 ENCOUNTER — OFFICE VISIT (OUTPATIENT)
Dept: OTOLARYNGOLOGY | Facility: CLINIC | Age: 29
End: 2024-03-08
Payer: COMMERCIAL

## 2024-03-08 VITALS
BODY MASS INDEX: 29.88 KG/M2 | SYSTOLIC BLOOD PRESSURE: 105 MMHG | DIASTOLIC BLOOD PRESSURE: 73 MMHG | WEIGHT: 163.38 LBS | HEART RATE: 87 BPM

## 2024-03-08 DIAGNOSIS — M54.2 NECK PAIN ON LEFT SIDE: ICD-10-CM

## 2024-03-08 DIAGNOSIS — R59.0 LAD (LYMPHADENOPATHY), POSTERIOR CERVICAL: ICD-10-CM

## 2024-03-08 PROCEDURE — 1159F MED LIST DOCD IN RCRD: CPT | Mod: CPTII,S$GLB,, | Performed by: OTOLARYNGOLOGY

## 2024-03-08 PROCEDURE — 3078F DIAST BP <80 MM HG: CPT | Mod: CPTII,S$GLB,, | Performed by: OTOLARYNGOLOGY

## 2024-03-08 PROCEDURE — 1160F RVW MEDS BY RX/DR IN RCRD: CPT | Mod: CPTII,S$GLB,, | Performed by: OTOLARYNGOLOGY

## 2024-03-08 PROCEDURE — 99999 PR PBB SHADOW E&M-EST. PATIENT-LVL III: CPT | Mod: PBBFAC,,, | Performed by: OTOLARYNGOLOGY

## 2024-03-08 PROCEDURE — 99204 OFFICE O/P NEW MOD 45 MIN: CPT | Mod: S$GLB,,, | Performed by: OTOLARYNGOLOGY

## 2024-03-08 PROCEDURE — 3074F SYST BP LT 130 MM HG: CPT | Mod: CPTII,S$GLB,, | Performed by: OTOLARYNGOLOGY

## 2024-03-08 PROCEDURE — 3008F BODY MASS INDEX DOCD: CPT | Mod: CPTII,S$GLB,, | Performed by: OTOLARYNGOLOGY

## 2024-03-08 NOTE — PROGRESS NOTES
"  [facility]    Subjective:      Patient: Avis Gutierrez Patient PCP: Laurence Kahn MD         :  1995     Sex:  female      MRN:  [id number]          Date of Visit: 2024      Chief Complaint: Other (Patient has a lump in neck that she has been experiencing since September of last year.  Patient states she visited urgent care ent for issue and was prescribed antibiotics but states she did not have infection.  Patient states lump has not gotten bigger or smaller.  Patient was referred by Dr. Kwok.  Dr. Kwok sent patient for MRI.  )      Patient ID: Avis Gutierrez is a 28 y.o. female     Patient is a  lifelong NON-smoker with a past medical history of   Elevated BMI referred to me by Dr. Connor Kwok in consultation for  posterior neck mass since 2023 seen by Dr. Connor Kwok ([facility]).  Prior to that she was prescribed antibiotics but there was no skin or upper respiratory infection.  No change.  Denies B symptoms of fevers, chills, weight loss, malaise, drenching night sweats or fatigue.  No known tuberculosis exposure or risk factors. Heme Onc notes reflect a mildly enlarged posterior cervical left lymph node and no other abnormality.  Alk fossa actually low AST ALT normal CBC without differential normal.      Imaging below.  Tiny subcutaneous mass superficial to the musculature of the posterior neck can well within the fat of the posterior neck noted correlating with area of concern is tiny and more likely represents a vessel though not enhancing    10/30/2023 seen T neck and chest reports "borderline prominent jugulodigastric nodes but no pathological adenopathy.          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    Labs:  WBC   Date Value Ref Range " Status   01/25/2024 6.57 3.90 - 12.70 K/uL Final     Hemoglobin   Date Value Ref Range Status   01/25/2024 14.4 12.0 - 16.0 g/dL Final     Platelets   Date Value Ref Range Status   01/25/2024 226 150 - 450 K/uL Final     Creatinine   Date Value Ref Range Status   01/25/2024 0.8 0.5 - 1.4 mg/dL Final     TSH   Date Value Ref Range Status   10/22/2018 1.53 0.45 - 5.33 uIU/mL Final     Hemoglobin A1C   Date Value Ref Range Status   08/29/2023 4.8 4.0 - 5.6 % Final     Comment:     ADA Screening Guidelines:  5.7-6.4%  Consistent with prediabetes  >or=6.5%  Consistent with diabetes    High levels of fetal hemoglobin interfere with the HbA1C  assay. Heterozygous hemoglobin variants (HbS, HgC, etc)do  not significantly interfere with this assay.   However, presence of multiple variants may affect accuracy.         Past Medical History  She has a past medical history of Cholelithiasis and LAD (lymphadenopathy), posterior cervical.    Family / Surgical / Social History  Her family history includes Breast cancer in her mother; Cancer in her mother; Thyroid disease in her sister.    History reviewed. No pertinent surgical history.    Social History     Tobacco Use    Smoking status: Never    Smokeless tobacco: Never   Substance and Sexual Activity    Alcohol use: Yes     Alcohol/week: 1.0 standard drink of alcohol     Types: 1 Cans of beer per week     Comment: 2 beer per month    Drug use: No    Sexual activity: Never     Birth control/protection: None       Medications  She has a current medication list which includes the following prescription(s): acyclovir.      Allergies  Review of patient's allergies indicates:  No Known Allergies    All medications, allergies, and past history have been reviewed.    Objective:      Vitals:      12/13/2023     9:51 AM 2/7/2024     9:03 AM 3/8/2024     1:22 PM   Vitals - 1 value per visit   SYSTOLIC 115 117 105   DIASTOLIC 74 81 73   Pulse 73 84 87   Temp 98.2 °F (36.8 °C) 98.1 °F (36.7  "°C)    Resp 16 14    SPO2  97 %    Weight (lb) 171.2 171 163.36   Weight (kg) 77.656 77.565 74.1   Height  5' 2" (1.575 m)    BMI (Calculated) 31.3 31.3    Pain Score Seven Four Zero       Body surface area is 1.8 meters squared.    Physical Exam:    GENERAL  APPEARANCE -  alert, appears stated age, and cooperative  BARRIER(S) TO COMMUNICATION -  none VOICE - appropriate for age and gender    INTEGUMENTARY  no suspicious head and neck lesions    HEENT  HEAD: Normocephalic, without obvious abnormality, atraumatic  FACE: INSPECTION - Symmetric, no signs of trauma, no suspicious lesion(s)      STRENGTH - facial symmetry intact     PALPATION -  No masses     SALIVARY GLANDS - non-tender with no appreciable mass    NECK/THYROID: normal atraumatic, no neck masses, normal thyroid, no jvd    EYES  Normal occular alignment and mobility with no visible nystagmus at rest    EARS/NOSE/MOUTH/THROAT  EARS  PINNAE AND EXTERNAL EARS - no suspicious lesion OTOSCOPIC EXAM (surgical microscopy was not used for visualization/instrumentation): EAR EXAM - Normal ear canals, tympanic membranes and mobility, and middle ear spaces bilaterally.  HEARING - grossly intact to voice/finger rub    NOSE AND SINUSES  EXTERNAL NOSE - Grossly normal for age/sex  SEPTUM - normal/no obstruction on anterior exam without decongestion TURBINATES - within normal limits MUCOSA - within normal limits     MOUTH AND THROAT   ORAL CAVITY, LIPS, TEETH, GUMS & TONGUE - moist, no suspicious lesions  OROPHARYNX /TONSILS/PHARYNGEAL WALLS/HYPOPHARYNX - no erythema or exudates  NASOPHARYNX - limited mirror exam - unable to visualize due to anatomy/gag  LARYNX -  - limited mirror exam - unable to visualize due to anatomy/gag      CHEST AND LUNG   INSPECTION & AUSCULTATION - normal effort, no stridor    CARDIOVASCULAR  AUSCULTATION & PERIPHERAL VASCULAR - regular rate and rhythm.    NEUROLOGIC  MENTAL STATUS - alert, interactive CRANIAL NERVES - " "normal    LYMPHATIC  HEAD AND NECK -  small level 2 nodes, small palpable mobile less than 1 cm left posterior node in the lower posterior neck; SUPRACLAVICULAR - non-palpable      Procedure(s):  None          Assessment:      Problem List Items Addressed This Visit          Other    LAD (lymphadenopathy), posterior cervical     Other Visit Diagnoses       Neck pain on left side                     Plan:       No symptoms of any upper aerodigestive disease.  No visible skin lesions or history of skin cancer.  No TB exposure.  Benign appearing small lymph node on imaging and palpation.  We discussed surveillance with ultrasound or even ultrasound-guided fine-needle aspiration biopsy with their inherent false positive and false negative diagnostic risk.  Reassurance provided.  Patient is to see neurosurgery about this C6-7 degeneration with "moderate" central spinal canal stenosis.  I told her that there was no report of any spinal cord impingement or abnormal signal but that she does need to follow up with Neurosurgery determine if observation, or surgical intervention are appropriate.    Follow up as needed          Voice recognition software was used in the creation of this note/communication and any sound-alike errors which may have occurred from its use should be taken in context when interpreting.  If such errors prevent a clear understanding of the note/communication, please contact the office for clarification.  "

## 2024-03-15 ENCOUNTER — TELEPHONE (OUTPATIENT)
Dept: NEUROSURGERY | Facility: CLINIC | Age: 29
End: 2024-03-15
Payer: COMMERCIAL

## 2024-03-15 DIAGNOSIS — R59.0 LAD (LYMPHADENOPATHY), POSTERIOR CERVICAL: Primary | ICD-10-CM

## 2024-03-25 ENCOUNTER — OFFICE VISIT (OUTPATIENT)
Dept: NEUROSURGERY | Facility: CLINIC | Age: 29
End: 2024-03-25
Payer: COMMERCIAL

## 2024-03-25 ENCOUNTER — HOSPITAL ENCOUNTER (OUTPATIENT)
Dept: RADIOLOGY | Facility: HOSPITAL | Age: 29
Discharge: HOME | End: 2024-03-25
Attending: NEUROLOGICAL SURGERY
Payer: COMMERCIAL

## 2024-03-25 VITALS
SYSTOLIC BLOOD PRESSURE: 123 MMHG | HEART RATE: 67 BPM | BODY MASS INDEX: 30.07 KG/M2 | WEIGHT: 163.38 LBS | DIASTOLIC BLOOD PRESSURE: 83 MMHG | HEIGHT: 62 IN

## 2024-03-25 DIAGNOSIS — M48.02 CERVICAL SPINAL STENOSIS: ICD-10-CM

## 2024-03-25 DIAGNOSIS — R59.0 LAD (LYMPHADENOPATHY), POSTERIOR CERVICAL: ICD-10-CM

## 2024-03-25 DIAGNOSIS — M47.812 SPONDYLOSIS WITHOUT MYELOPATHY OR RADICULOPATHY, CERVICAL REGION: Primary | ICD-10-CM

## 2024-03-25 DIAGNOSIS — M48.00 CENTRAL STENOSIS OF SPINAL CANAL: ICD-10-CM

## 2024-03-25 PROCEDURE — 3074F SYST BP LT 130 MM HG: CPT | Mod: CPTII,S$GLB,, | Performed by: NEUROLOGICAL SURGERY

## 2024-03-25 PROCEDURE — 3079F DIAST BP 80-89 MM HG: CPT | Mod: CPTII,S$GLB,, | Performed by: NEUROLOGICAL SURGERY

## 2024-03-25 PROCEDURE — 3008F BODY MASS INDEX DOCD: CPT | Mod: CPTII,S$GLB,, | Performed by: NEUROLOGICAL SURGERY

## 2024-03-25 PROCEDURE — 72050 X-RAY EXAM NECK SPINE 4/5VWS: CPT | Mod: 26,,, | Performed by: RADIOLOGY

## 2024-03-25 PROCEDURE — 1159F MED LIST DOCD IN RCRD: CPT | Mod: CPTII,S$GLB,, | Performed by: NEUROLOGICAL SURGERY

## 2024-03-25 PROCEDURE — 99204 OFFICE O/P NEW MOD 45 MIN: CPT | Mod: S$GLB,,, | Performed by: NEUROLOGICAL SURGERY

## 2024-03-25 PROCEDURE — 72050 X-RAY EXAM NECK SPINE 4/5VWS: CPT | Mod: TC,FY

## 2024-03-25 PROCEDURE — 99999 PR PBB SHADOW E&M-EST. PATIENT-LVL III: CPT | Mod: PBBFAC,,, | Performed by: NEUROLOGICAL SURGERY

## 2024-03-25 NOTE — PROGRESS NOTES
NEUROSURGICAL OUTPATIENT CONSULTATION NOTE    DATE OF SERVICE:  03/25/2024    ATTENDING PHYSICIAN:  Sergei Avilez MD    CONSULT REQUESTED BY:  Dr Dang    REASON FOR CONSULT:  Spondylosis, cervical    SUBJECTIVE:    HISTORY:  A 28-year-old female without significant past medical history who was referred for a C6-C7 disc herniation for further evaluation.  She reports that initially she went to be evaluated for left posterior cervical triangle mass for which MRI was obtained.  MRI revealed C6-C7 disc herniation with some foraminal stenosis left more than right for which she was referred to our clinic for further evaluation.      Upon further questioning, she reports some chronic neck pain and sometimes she feel it difficult to find herself comfortable especially during sleep and she has to stretch her shoulders scapula as as well as neck be comfortable.  Pain is aggravated with movement.  She also reports some burning pain around her left scapula.  She denies any radicular pain to the arm forearm or hand.  She denies any myelopathy symptoms.  No weakness, numbness, tingling, bowel or bladder dysfunction, or gait instability.  She denies any trial of physical therapy, chiropractic, or steroid injections.  She has not taking any oral medications.      PAST MEDICAL HISTORY:  Active Ambulatory Problems     Diagnosis Date Noted    Amenorrhea 08/30/2013    Obesity 08/30/2013    LAD (lymphadenopathy), posterior cervical 10/17/2023     Resolved Ambulatory Problems     Diagnosis Date Noted    No Resolved Ambulatory Problems     Past Medical History:   Diagnosis Date    Cholelithiasis 2016       PAST SURGICAL HISTORY:  No past surgical history on file.    SOCIAL HISTORY:   Social History     Socioeconomic History    Marital status: Single   Occupational History    Occupation: student & work at [employer]    Tobacco Use    Smoking status: Never    Smokeless tobacco: Never   Substance and Sexual Activity    Alcohol use:  Yes     Alcohol/week: 1.0 standard drink of alcohol     Types: 1 Cans of beer per week     Comment: 2 beer per month    Drug use: No    Sexual activity: Never     Birth control/protection: None       FAMILY HISTORY:  Family History   Problem Relation Age of Onset    Breast cancer Mother     Cancer Mother         breast    Thyroid disease Sister        CURRENTS MEDICATIONS:  Current Outpatient Medications on File Prior to Visit   Medication Sig Dispense Refill    acyclovir (ZOVIRAX) 400 MG tablet TAKE 1 TABLET BY MOUTH 3 TIMES DAILY. (Patient not taking: Reported on 10/18/2023) 30 tablet 0     No current facility-administered medications on file prior to visit.       ALLERGIES:  Review of patient's allergies indicates:  No Known Allergies    REVIEW OF SYSTEMS:  Review of Systems   Constitutional:  Negative for diaphoresis, fever and weight loss.   Respiratory:  Negative for shortness of breath.    Cardiovascular:  Negative for chest pain.   Gastrointestinal:  Negative for blood in stool.   Genitourinary:  Negative for hematuria.   Endo/Heme/Allergies:  Does not bruise/bleed easily.   All other systems reviewed and are negative.      OBJECTIVE:    PHYSICAL EXAMINATION:   Vitals:    03/25/24 0803   BP: 123/83   Pulse: 67       Physical Exam:  Vitals reviewed.    Constitutional: She appears well-developed and well-nourished.     Eyes: Pupils are equal, round, and reactive to light. Conjunctivae and EOM are normal.     Cardiovascular: Normal distal pulses and no edema.     Abdominal: Soft.     Skin: Skin displays no rash on trunk and no rash on extremities. Skin displays no lesions on trunk and no lesions on extremities.     Psych/Behavior: She is alert. She is oriented to person, place, and time. She has a normal mood and affect.     Musculoskeletal:        Neck: Range of motion is full.     Neurological:        DTRs: Tricep reflexes are 2+ on the right side and 2+ on the left side. Bicep reflexes are 2+ on the right  side and 2+ on the left side. Brachioradialis reflexes are 2+ on the right side and 2+ on the left side. Patellar reflexes are 2+ on the right side and 2+ on the left side. Achilles reflexes are 2+ on the right side and 2+ on the left side.       Back Exam     Tenderness   The patient is experiencing no tenderness.     Range of Motion   Extension:  normal   Flexion:  normal   Lateral bend right:  normal   Lateral bend left:  normal   Rotation right:  normal   Rotation left:  normal     Muscle Strength   Right Quadriceps:  5/5   Left Quadriceps:  5/5   Right Hamstrings:  5/5   Left Hamstrings:  5/5     Tests   Straight leg raise right: negative  Straight leg raise left: negative    Other   Toe walk: normal  Heel walk: normal              Neurologic Exam     Mental Status   Oriented to person, place, and time.   Speech: speech is normal   Level of consciousness: alert    Cranial Nerves   Cranial nerves II through XII intact.     CN III, IV, VI   Pupils are equal, round, and reactive to light.  Extraocular motions are normal.     Motor Exam   Muscle bulk: normal  Overall muscle tone: normal    Strength   Right deltoid: 5/5  Left deltoid: 5/5  Right biceps: 5/5  Left biceps: 5/5  Right triceps: 5/5  Left triceps: 5/5  Right wrist flexion: 5/5  Left wrist flexion: 5/5  Right wrist extension: 5/5  Left wrist extension: 5/5  Right interossei: 5/5  Left interossei: 5/5  Right iliopsoas: 5/5  Left iliopsoas: 5/5  Right quadriceps: 5/5  Left quadriceps: 5/5  Right hamstrin/5  Left hamstrin/5  Right anterior tibial: 5/5  Left anterior tibial: 5/5  Right posterior tibial: 5/5  Left posterior tibial: 5/5  Right peroneal: 5/5  Left peroneal: 5/5  Right gastroc: 5/5  Left gastroc: 5/5    Sensory Exam   Light touch normal.   Pinprick normal.     Gait, Coordination, and Reflexes     Gait  Gait: normal    Coordination   Finger to nose coordination: normal  Tandem walking coordination: normal    Reflexes   Right  brachioradialis: 2+  Left brachioradialis: 2+  Right biceps: 2+  Left biceps: 2+  Right triceps: 2+  Left triceps: 2+  Right patellar: 2+  Left patellar: 2+  Right achilles: 2+  Left achilles: 2+  Right plantar: normal  Left plantar: normal  Right Salas: absent  Left Salas: absent  Right ankle clonus: absent  Left ankle clonus: absent    Left posterior cervical lump palpated, mobile, no painful, small    DIAGNOSTIC DATA:  I personally interpreted the following imaging:   Cervical spine MRI reviewed showing enlarged adenopathy, C6-7 left-sided disc bulge without significant foraminal stenosis, slight cord displacement    ASSESSMENT:  This is a 28 y.o. female with     Problem List Items Addressed This Visit    None  Visit Diagnoses       Spondylosis without myelopathy or radiculopathy, cervical region    -  Primary    Relevant Orders    Ambulatory referral/consult to Physical/Occupational Therapy    Central stenosis of spinal canal        Cervical spinal stenosis                PLAN:  Patient reports that she had a more physical job prior where she was lifting and twisting heavy weights.  At this time she has not lifting anymore.  She is planning to transitioned to a more sedentary positioned.    Patient could benefit from physical therapy including chin-tuck, deep neck flexor muscle strengthening exercises, posture correction exercises.    Patient will follow up as needed if her condition deteriorates or if develops new onset of arm pain, numbness weakness or sphincter dysfunction symptoms    All questions answered.        Sergei Avilez MD  Cell:[phone]